# Patient Record
Sex: FEMALE | Race: BLACK OR AFRICAN AMERICAN
[De-identification: names, ages, dates, MRNs, and addresses within clinical notes are randomized per-mention and may not be internally consistent; named-entity substitution may affect disease eponyms.]

---

## 2017-05-04 ENCOUNTER — FORM ENCOUNTER (OUTPATIENT)
Age: 52
End: 2017-05-04

## 2017-11-07 ENCOUNTER — FORM ENCOUNTER (OUTPATIENT)
Age: 52
End: 2017-11-07

## 2018-05-10 ENCOUNTER — FORM ENCOUNTER (OUTPATIENT)
Age: 53
End: 2018-05-10

## 2018-09-06 ENCOUNTER — FORM ENCOUNTER (OUTPATIENT)
Age: 53
End: 2018-09-06

## 2019-05-12 ENCOUNTER — FORM ENCOUNTER (OUTPATIENT)
Age: 54
End: 2019-05-12

## 2020-08-02 ENCOUNTER — FORM ENCOUNTER (OUTPATIENT)
Age: 55
End: 2020-08-02

## 2021-02-08 PROBLEM — Z00.00 ENCOUNTER FOR PREVENTIVE HEALTH EXAMINATION: Status: ACTIVE | Noted: 2021-02-08

## 2021-02-24 ENCOUNTER — APPOINTMENT (OUTPATIENT)
Dept: BREAST CENTER | Facility: CLINIC | Age: 56
End: 2021-02-24
Payer: COMMERCIAL

## 2021-02-24 VITALS
BODY MASS INDEX: 30.01 KG/M2 | HEART RATE: 81 BPM | WEIGHT: 198 LBS | DIASTOLIC BLOOD PRESSURE: 86 MMHG | SYSTOLIC BLOOD PRESSURE: 141 MMHG | HEIGHT: 68 IN

## 2021-02-24 PROCEDURE — 99213 OFFICE O/P EST LOW 20 MIN: CPT

## 2021-02-24 PROCEDURE — 99072 ADDL SUPL MATRL&STAF TM PHE: CPT

## 2021-02-24 RX ORDER — HYDROCHLOROTHIAZIDE 12.5 MG/1
CAPSULE ORAL
Refills: 0 | Status: ACTIVE | COMMUNITY

## 2021-02-24 RX ORDER — ATORVASTATIN CALCIUM 80 MG/1
TABLET, FILM COATED ORAL
Refills: 0 | Status: ACTIVE | COMMUNITY

## 2021-02-24 RX ORDER — LISINOPRIL 30 MG/1
TABLET ORAL
Refills: 0 | Status: ACTIVE | COMMUNITY

## 2021-08-02 PROBLEM — E78.00 HYPERCHOLESTEROLEMIA: Status: ACTIVE | Noted: 2021-02-24

## 2021-08-02 PROBLEM — I10 HYPERTENSION: Status: ACTIVE | Noted: 2021-02-24

## 2021-08-04 ENCOUNTER — APPOINTMENT (OUTPATIENT)
Dept: BREAST CENTER | Facility: CLINIC | Age: 56
End: 2021-08-04
Payer: COMMERCIAL

## 2021-08-04 VITALS — OXYGEN SATURATION: 97 % | BODY MASS INDEX: 26.52 KG/M2 | WEIGHT: 175 LBS | HEIGHT: 68 IN | HEART RATE: 75 BPM

## 2021-08-04 DIAGNOSIS — E78.00 PURE HYPERCHOLESTEROLEMIA, UNSPECIFIED: ICD-10-CM

## 2021-08-04 DIAGNOSIS — I10 ESSENTIAL (PRIMARY) HYPERTENSION: ICD-10-CM

## 2021-08-04 PROCEDURE — 99213 OFFICE O/P EST LOW 20 MIN: CPT

## 2022-02-16 ENCOUNTER — APPOINTMENT (OUTPATIENT)
Dept: BREAST CENTER | Facility: CLINIC | Age: 57
End: 2022-02-16
Payer: COMMERCIAL

## 2022-02-16 VITALS
DIASTOLIC BLOOD PRESSURE: 90 MMHG | WEIGHT: 178 LBS | HEART RATE: 76 BPM | HEIGHT: 68 IN | BODY MASS INDEX: 26.98 KG/M2 | SYSTOLIC BLOOD PRESSURE: 149 MMHG

## 2022-02-16 PROCEDURE — 99213 OFFICE O/P EST LOW 20 MIN: CPT

## 2022-11-09 PROBLEM — N64.89 OTHER SPECIFIED DISORDERS OF BREAST: Status: ACTIVE | Noted: 2021-02-22

## 2022-11-14 ENCOUNTER — APPOINTMENT (OUTPATIENT)
Dept: BREAST CENTER | Facility: CLINIC | Age: 57
End: 2022-11-14

## 2022-11-14 VITALS
HEART RATE: 69 BPM | DIASTOLIC BLOOD PRESSURE: 93 MMHG | HEIGHT: 68 IN | WEIGHT: 192 LBS | BODY MASS INDEX: 29.1 KG/M2 | SYSTOLIC BLOOD PRESSURE: 139 MMHG

## 2022-11-14 DIAGNOSIS — N64.89 OTHER SPECIFIED DISORDERS OF BREAST: ICD-10-CM

## 2022-11-14 DIAGNOSIS — Z78.9 OTHER SPECIFIED HEALTH STATUS: ICD-10-CM

## 2022-11-14 PROCEDURE — 99213 OFFICE O/P EST LOW 20 MIN: CPT

## 2022-11-16 ENCOUNTER — APPOINTMENT (OUTPATIENT)
Dept: HEMATOLOGY ONCOLOGY | Facility: CLINIC | Age: 57
End: 2022-11-16

## 2022-11-16 NOTE — DISCUSSION/SUMMARY
[FreeTextEntry1] : The visit was provided via telehealth using real-time 2-way audio visual technology. The patient, Trina Linton, was located at home, [place], [place], at the time of the visit. The provider, Leisa Donnelly, was located at the medical office located in [place], [place] at the time of the visit. The patient, Trina Linton, and Provider participated in the telehealth encounter. Verbal consent for telehealth services was given on 2022 by the patient, Trina Linton.\par \par REASON FOR CONSULT\par Trina Linton is a 57-year-old female referred by Dr. Lucy Burch for cancer genetic counseling and risk assessment due to a family history of breast cancer. Ms. Linton was seen on 2022, at which time medical and family history was ascertained and a pedigree constructed. \par \par RELEVANT MEDICAL HISTORY\par Ms. Linton is a healthy individual with no reported history of cancer. She has a family history of cancer, see below.\par \par OTHER MEDICAL AND SURGICAL HISTORY:\par •	Medical History: hypercholesteremia, hypertension\par •	Surgical History: D&C, eye surgery, foot surgery\par \par OB/GYN HISTORY:\par Obstetrical History: \par Age at Menarche: 11\par Menopausal Status: Post-menopausal with LMP at age 48 \par Age at First Live Birth: N/A\par Oral Contraceptive Use: Yes, 1 year in total\par Hormone Replacement Therapy: No\par \par CANCER SCREENING HISTORY:  \par Breast: \par •	Mammography: last 2022, normal\par •	Sonography: last 2022, normal\par •	MRI: No\par •	Biopsies: >10 years ago-Left breast, reportedly benign\par GYN:\par •	Pelvic Examination: last 2022, reportedly normal\par •	Sonography: Yes, she reports that she had a transvaginal ultrasound once >10 years ago.\par •	CA-125: No\par Colon:\par •	Colonoscopy: last 4 years ago, she reports 1-2 polyps were identified. Next colonoscopy was recommended in 5 years. She reports history of 1-2 polyps identified on previous colonoscopies.\par •	Upper Endoscopy: No\par •	FOBT: No\par Skin:  \par •	FBSE: last , reportedly normal\par •	Lesions biopsied/removed: Yes, she reports that she had a benign skin lesion removed from her stomach. \par \par SOCIAL HISTORY:\par •	Tobacco-product use: Yes, former (quit >25 years ago; 15-20 years in total)\par •	Environmental exposures: No\par \par FAMILY HISTORY:\par Maternal ancestry was reported as  and [place] and paternal ancestry was unknown. Ashkenazi Bahai ancestry and consanguinity were denied. A detailed family history of cancer was ascertained, see below and scanned chart for pedigree. \par \par Of note, Ms. Linton does not have contact with or access to the medical history for her paternal relatives. \par \par According to Ms. Linton, no one in the family has had germline testing for cancer susceptibility. \par 	\par 	RISK ASSESSMENT:\par Ms. Linton’s family history is suggestive of a hereditary cancer syndrome given her maternal aunt’s breast cancer diagnosis at 62 years old, her maternal 1st cousin’s bone or pancreatic cancer diagnosis at 40 years old, and her paternal 1st cousin’s breast cancer diagnosis in her 30’s. The patient meets National Comprehensive Cancer Network (NCCN) criteria for genetic testing. We recommended genetic testing for genes associated with breast cancer. This test analyzes [11] genes: LAMONT, BARD1, BRCA1, BRCA2, CDH1, CHEK2, NF1, PALB2, PTEN, STK11, and TP53.\par \par The risks, benefits and limitations of genetic testing were discussed with Ms. Linton. In addition, we discussed the purpose of genetic testing and possible test results (positive, negative, inconclusive) along with associated medical management options and psychosocial implications. Insurance coverage and potential out of pocket costs were also discussed. A benefits investigation was performed, and Ms. Linton opted to proceed with genetic testing using the self-pay option. The Genetic Information Non-discrimination Act (NORA) was reviewed, and she was provided with written information regarding NORA. \par \par It was explained that risk assessment is based upon medical and family history as provided and may change in the future should new information be obtained. \par \par Following our discussion, Ms. Linton verbally consented to the above-mentioned genetic testing panel. A saliva kit was provided to her at the time of her appointment with Dr. Benedicto Burch on 2022 prior to her telehealth genetics appointment.\par \par PLAN:\par \par 1.	A saliva kit was provided to her on 2022 to submit her saliva sample to [facility].\par 2.	We will contact MsCaesar Royer to schedule a follow-up appointment once the results are available. Results generally return in 2-3 weeks after the laboratory has received the patient’s sample.\par \par For any additional questions please call Cancer Genetics at [phone]. \par \par Leisa Donnelly, MS, [facility]\par Genetic Counselor, Cancer Genetics\par \par \par

## 2022-12-30 ENCOUNTER — NON-APPOINTMENT (OUTPATIENT)
Age: 57
End: 2022-12-30

## 2022-12-30 NOTE — DISCUSSION/SUMMARY
[FreeTextEntry1] : Patient was contacted multiple times to complete her genetic testing by sending her saliva kit to the testing company. This was never completed. A letter was sent today by our GCA, Sharif Castillo, requesting that she contact our office to indicate whether she would still like to pursue genetic testing, otherwise the order will be canceled in two weeks.\par \par Leisa Donnelly MS, [facility]\par Genetic Counselor, Cancer Genetics\par  \par

## 2023-01-19 ENCOUNTER — NON-APPOINTMENT (OUTPATIENT)
Age: 58
End: 2023-01-19

## 2023-01-19 NOTE — DISCUSSION/SUMMARY
[FreeTextEntry1] : REASON FOR CONSULT Trina Linton is a 57-year-old female who was contacted on 01/19/2023 for a discussion regarding her negative genetic testing results related to hereditary cancer predisposition. This session was conducted via telephone.   Ms. Linton was originally seen by the Cancer Genetics Service on 11/16/2022 for hereditary cancer predisposition risk assessment due to a family history of breast cancer. At that time, Ms. Linton decided to pursue genetic testing for genes associated with breast cancer offered by [facility].  TEST RESULTS: NEGATIVE NO pathogenic (disease-causing) variants or variants of uncertain significance were detected in any of the following genes [11]:  LAMONT, BARD1, BRCA1, BRCA2, CDH1, CHEK2, NF1, PALB2, PTEN, STK11, and TP53.  RESULTS INTERPRETATION AND ASSESSMENT: Given Ms. Linton’s personal medical history and current reported family history of cancer, and her negative genetic test results, the following screening guidelines and risk-reducing recommendations were discussed:  OTHER: •	In the absence of other indications, Ms. Linton should practice age-appropriate cancer screening of other organ systems as recommended for the general population.  We also discussed the limitations of negative results: 1.	The cause of Ms. Linton’s family history of cancer remains unknown. The cancer(s) may have developed randomly, or due to environmental factors.   2.	This negative result does not completely rule out a hereditary basis for the reported personal and/or family history due to limitations in technology or a variant being present in an unidentified gene.  3.	Variants in other genes would not be identified by this analysis, so this negative result does not rule out the likelihood of having a mutation in a different hereditary cancer gene or the possibility of ever developing cancer. 4.	It is possible there is a hereditary cancer predisposition gene mutation in the family, but the patient did not inherit it.   We informed Ms. Linton that our knowledge of genetics and inherited cancer conditions is changing rapidly. Therefore, we recommended that Ms. Linton contact our office, every 2 to 3 years, to discuss relevant advances in cancer genetics.  We emphasized the importance of re-contacting us with updates regarding her personal and family history of cancer as well as any updates regarding additional cancer genetic test results performed for the patient and/or family members.  Such updates could possibly change our risk assessment and recommendations.   PLAN: 1.	These results do not change Ms. Linton’s medical management.  2.	Patient informed consult note(s) will be available through their [facility] patient portal and genetic test results will be released via [facility]’s portal. 3.	Ms. Linton was encouraged to contact us every 2-3 years to discuss relevant advances in cancer genetics, or sooner if there are any changes in her personal or family history of cancer.  For any additional questions please call Cancer Genetics at [phone].   Leisa Donnelly MS, [facility] Genetic Counselor, Cancer Genetics

## 2023-11-22 ENCOUNTER — NON-APPOINTMENT (OUTPATIENT)
Age: 58
End: 2023-11-22

## 2023-11-22 ENCOUNTER — APPOINTMENT (OUTPATIENT)
Dept: BREAST CENTER | Facility: CLINIC | Age: 58
End: 2023-11-22
Payer: COMMERCIAL

## 2023-11-22 VITALS
WEIGHT: 184 LBS | BODY MASS INDEX: 27.89 KG/M2 | DIASTOLIC BLOOD PRESSURE: 93 MMHG | HEIGHT: 68 IN | SYSTOLIC BLOOD PRESSURE: 142 MMHG | HEART RATE: 66 BPM

## 2023-11-22 DIAGNOSIS — Z80.3 FAMILY HISTORY OF MALIGNANT NEOPLASM OF BREAST: ICD-10-CM

## 2023-11-22 DIAGNOSIS — Z78.9 OTHER SPECIFIED HEALTH STATUS: ICD-10-CM

## 2023-11-22 DIAGNOSIS — Z12.39 ENCOUNTER FOR OTHER SCREENING FOR MALIGNANT NEOPLASM OF BREAST: ICD-10-CM

## 2023-11-22 PROCEDURE — 99213 OFFICE O/P EST LOW 20 MIN: CPT

## 2023-11-22 RX ORDER — METFORMIN HYDROCHLORIDE 625 MG/1
TABLET ORAL
Refills: 0 | Status: ACTIVE | COMMUNITY

## 2024-08-28 ENCOUNTER — NON-APPOINTMENT (OUTPATIENT)
Age: 59
End: 2024-08-28

## 2024-08-29 ENCOUNTER — APPOINTMENT (OUTPATIENT)
Dept: PULMONOLOGY | Facility: CLINIC | Age: 59
End: 2024-08-29
Payer: COMMERCIAL

## 2024-08-29 ENCOUNTER — TRANSCRIPTION ENCOUNTER (OUTPATIENT)
Age: 59
End: 2024-08-29

## 2024-08-29 VITALS
HEIGHT: 68 IN | OXYGEN SATURATION: 99 % | TEMPERATURE: 98.2 F | HEART RATE: 101 BPM | WEIGHT: 180 LBS | BODY MASS INDEX: 27.28 KG/M2 | SYSTOLIC BLOOD PRESSURE: 140 MMHG | DIASTOLIC BLOOD PRESSURE: 88 MMHG

## 2024-08-29 DIAGNOSIS — Z87.891 PERSONAL HISTORY OF NICOTINE DEPENDENCE: ICD-10-CM

## 2024-08-29 DIAGNOSIS — R91.1 SOLITARY PULMONARY NODULE: ICD-10-CM

## 2024-08-29 PROCEDURE — 99203 OFFICE O/P NEW LOW 30 MIN: CPT

## 2024-08-29 PROCEDURE — G2211 COMPLEX E/M VISIT ADD ON: CPT | Mod: NC

## 2024-08-29 NOTE — ASSESSMENT
[FreeTextEntry1] : 59 year old with remove smoking hx presenting for MARIA GUADALUPE ground glass nodule  Data reviewed: CT chest 8/26/2024- MARIA GUADALUPE 17mm pure ground glass nodule with no solid component, 6.2mm pure ground glass nodule, and 4mm solid subpleural left lower lobe nodule.   Lung nodule Former smoker  Patent smoked about 3 cigarettes for 20 years putting her smoking hx at very minimal. After review of the lung nodules they are both pure ground glass less 30mm with the solid listed as less 6mm. Lung RADS v1.1 list risk of <1% of malignancy and likely benign and also per Fleischner society recommendation is to repeat CT chest in 1 year.  - Repeat CT chest in 1 year given size of solid nodules and ground glass nodule  RTC in 1 year

## 2024-08-29 NOTE — HISTORY OF PRESENT ILLNESS
[Former] : former [< 20 pack-years] : < 20 pack-years [TextBox_4] : 59-year-old with distant smoking hx presenting for incidentally found lung nodules. Patient had CT scan of the abdomen which showed at 0.2mm nodule with dedicated CT chest showing 3 nodules present of various sizes. Patient states that she has an aunt who did have lung cancer but nothing in her mother or father. 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 She has family history of breath cancer as well for which she was found to be BRCA negative. Given the lung nodules being present she was referred to pulmonary. [ESS] : 0

## 2024-12-05 ENCOUNTER — APPOINTMENT (OUTPATIENT)
Dept: BREAST CENTER | Facility: CLINIC | Age: 59
End: 2024-12-05
Payer: COMMERCIAL

## 2024-12-05 VITALS
WEIGHT: 166 LBS | SYSTOLIC BLOOD PRESSURE: 104 MMHG | DIASTOLIC BLOOD PRESSURE: 72 MMHG | BODY MASS INDEX: 25.24 KG/M2 | HEART RATE: 85 BPM

## 2024-12-05 DIAGNOSIS — Z12.39 ENCOUNTER FOR OTHER SCREENING FOR MALIGNANT NEOPLASM OF BREAST: ICD-10-CM

## 2024-12-05 PROCEDURE — 99213 OFFICE O/P EST LOW 20 MIN: CPT

## 2024-12-05 RX ORDER — TIRZEPATIDE 5 MG/.5ML
INJECTION, SOLUTION SUBCUTANEOUS
Refills: 0 | Status: ACTIVE | COMMUNITY

## 2025-06-04 ENCOUNTER — TRANSCRIPTION ENCOUNTER (OUTPATIENT)
Age: 60
End: 2025-06-04

## 2025-06-04 ENCOUNTER — NON-APPOINTMENT (OUTPATIENT)
Age: 60
End: 2025-06-04

## 2025-07-28 ENCOUNTER — OUTPATIENT (OUTPATIENT)
Dept: OUTPATIENT SERVICES | Facility: HOSPITAL | Age: 60
LOS: 1 days | End: 2025-07-28

## 2025-07-28 ENCOUNTER — APPOINTMENT (OUTPATIENT)
Dept: CT IMAGING | Facility: CLINIC | Age: 60
End: 2025-07-28
Payer: COMMERCIAL

## 2025-07-28 PROCEDURE — 71250 CT THORAX DX C-: CPT | Mod: 26

## 2025-07-30 ENCOUNTER — NON-APPOINTMENT (OUTPATIENT)
Age: 60
End: 2025-07-30